# Patient Record
Sex: FEMALE | Race: WHITE | NOT HISPANIC OR LATINO
[De-identification: names, ages, dates, MRNs, and addresses within clinical notes are randomized per-mention and may not be internally consistent; named-entity substitution may affect disease eponyms.]

---

## 2018-04-27 ENCOUNTER — RESULT REVIEW (OUTPATIENT)
Age: 33
End: 2018-04-27

## 2018-07-13 VITALS
RESPIRATION RATE: 18 BRPM | TEMPERATURE: 97 F | SYSTOLIC BLOOD PRESSURE: 110 MMHG | WEIGHT: 119.93 LBS | HEIGHT: 63 IN | HEART RATE: 50 BPM | DIASTOLIC BLOOD PRESSURE: 63 MMHG | OXYGEN SATURATION: 98 %

## 2018-07-16 ENCOUNTER — RESULT REVIEW (OUTPATIENT)
Age: 33
End: 2018-07-16

## 2018-07-16 ENCOUNTER — OUTPATIENT (OUTPATIENT)
Dept: OUTPATIENT SERVICES | Facility: HOSPITAL | Age: 33
LOS: 1 days | Discharge: HOME | End: 2018-07-16
Payer: COMMERCIAL

## 2018-07-16 VITALS
RESPIRATION RATE: 15 BRPM | OXYGEN SATURATION: 94 % | DIASTOLIC BLOOD PRESSURE: 50 MMHG | SYSTOLIC BLOOD PRESSURE: 103 MMHG | HEART RATE: 64 BPM

## 2018-07-16 DIAGNOSIS — O34.40 MATERNAL CARE FOR OTHER ABNORMALITIES OF CERVIX, UNSPECIFIED TRIMESTER: Chronic | ICD-10-CM

## 2018-07-16 PROCEDURE — 58558 HYSTEROSCOPY BIOPSY: CPT

## 2018-07-16 PROCEDURE — C1889: CPT

## 2018-07-16 PROCEDURE — 86901 BLOOD TYPING SEROLOGIC RH(D): CPT

## 2018-07-16 PROCEDURE — 86850 RBC ANTIBODY SCREEN: CPT

## 2018-07-16 PROCEDURE — 58662 LAPAROSCOPY EXCISE LESIONS: CPT

## 2018-07-16 PROCEDURE — 86900 BLOOD TYPING SEROLOGIC ABO: CPT

## 2018-07-16 PROCEDURE — 88305 TISSUE EXAM BY PATHOLOGIST: CPT

## 2018-07-16 RX ORDER — KETOROLAC TROMETHAMINE 30 MG/ML
30 SYRINGE (ML) INJECTION ONCE
Qty: 0 | Refills: 0 | Status: DISCONTINUED | OUTPATIENT
Start: 2018-07-16 | End: 2018-07-16

## 2018-07-16 RX ORDER — SODIUM CHLORIDE 9 MG/ML
1000 INJECTION, SOLUTION INTRAVENOUS
Qty: 0 | Refills: 0 | Status: DISCONTINUED | OUTPATIENT
Start: 2018-07-16 | End: 2018-07-16

## 2018-07-16 RX ORDER — ONDANSETRON 8 MG/1
4 TABLET, FILM COATED ORAL ONCE
Qty: 0 | Refills: 0 | Status: DISCONTINUED | OUTPATIENT
Start: 2018-07-16 | End: 2018-07-16

## 2018-07-16 RX ORDER — DIPHENHYDRAMINE HCL 50 MG
25 CAPSULE ORAL ONCE
Qty: 0 | Refills: 0 | Status: COMPLETED | OUTPATIENT
Start: 2018-07-16 | End: 2018-07-16

## 2018-07-16 RX ORDER — IBUPROFEN 200 MG
600 TABLET ORAL ONCE
Qty: 0 | Refills: 0 | Status: COMPLETED | OUTPATIENT
Start: 2018-07-16 | End: 2018-07-16

## 2018-07-16 RX ORDER — MORPHINE SULFATE 50 MG/1
4 CAPSULE, EXTENDED RELEASE ORAL ONCE
Qty: 0 | Refills: 0 | Status: DISCONTINUED | OUTPATIENT
Start: 2018-07-16 | End: 2018-07-16

## 2018-07-16 RX ADMIN — Medication 600 MILLIGRAM(S): at 16:35

## 2018-07-16 RX ADMIN — MORPHINE SULFATE 4 MILLIGRAM(S): 50 CAPSULE, EXTENDED RELEASE ORAL at 13:15

## 2018-07-16 RX ADMIN — Medication 600 MILLIGRAM(S): at 17:15

## 2018-07-16 RX ADMIN — MORPHINE SULFATE 4 MILLIGRAM(S): 50 CAPSULE, EXTENDED RELEASE ORAL at 13:00

## 2018-07-16 RX ADMIN — Medication 25 MILLIGRAM(S): at 13:25

## 2018-07-16 NOTE — BRIEF OPERATIVE NOTE - POST-OP DX
Endometriosis  07/16/2018    Active  Vicky Grewal  Uterine polyp  07/16/2018    Active  Vicky Grewal

## 2018-07-16 NOTE — PACU DISCHARGE NOTE - COMMENTS
1715 Pt comfortable . VSS .Abdominal dressing clean and dry. Pt voided.  IV dc'd-dressing applied  Home care and medication reconciliation done with Pt/mother

## 2018-07-16 NOTE — BRIEF OPERATIVE NOTE - PROCEDURE
<<-----Click on this checkbox to enter Procedure Laparoscopy with biopsy  07/16/2018    Active  TAMIA  Hysteroscopic polypectomy of uterus with dilation and curettage  07/16/2018    Active  TAMIA

## 2018-07-23 LAB — SURGICAL PATHOLOGY STUDY: SIGNIFICANT CHANGE UP

## 2020-09-09 PROBLEM — G43.909 MIGRAINE, UNSPECIFIED, NOT INTRACTABLE, WITHOUT STATUS MIGRAINOSUS: Chronic | Status: ACTIVE | Noted: 2018-07-13

## 2020-09-09 PROBLEM — F32.9 MAJOR DEPRESSIVE DISORDER, SINGLE EPISODE, UNSPECIFIED: Chronic | Status: ACTIVE | Noted: 2018-07-13

## 2020-09-09 PROBLEM — F41.9 ANXIETY DISORDER, UNSPECIFIED: Chronic | Status: ACTIVE | Noted: 2018-07-13

## 2020-09-09 PROBLEM — Z00.00 ENCOUNTER FOR PREVENTIVE HEALTH EXAMINATION: Status: ACTIVE | Noted: 2020-09-09

## 2020-09-28 ENCOUNTER — APPOINTMENT (OUTPATIENT)
Dept: COLORECTAL SURGERY | Facility: CLINIC | Age: 35
End: 2020-09-28
Payer: COMMERCIAL

## 2020-09-28 VITALS
HEIGHT: 63 IN | HEART RATE: 54 BPM | BODY MASS INDEX: 21.09 KG/M2 | DIASTOLIC BLOOD PRESSURE: 71 MMHG | OXYGEN SATURATION: 99 % | SYSTOLIC BLOOD PRESSURE: 122 MMHG | WEIGHT: 119 LBS | TEMPERATURE: 98.1 F

## 2020-09-28 DIAGNOSIS — K64.4 RESIDUAL HEMORRHOIDAL SKIN TAGS: ICD-10-CM

## 2020-09-28 DIAGNOSIS — Z82.49 FAMILY HISTORY OF ISCHEMIC HEART DISEASE AND OTHER DISEASES OF THE CIRCULATORY SYSTEM: ICD-10-CM

## 2020-09-28 DIAGNOSIS — Z87.42 PERSONAL HISTORY OF OTHER DISEASES OF THE FEMALE GENITAL TRACT: ICD-10-CM

## 2020-09-28 DIAGNOSIS — Z80.8 FAMILY HISTORY OF MALIGNANT NEOPLASM OF OTHER ORGANS OR SYSTEMS: ICD-10-CM

## 2020-09-28 DIAGNOSIS — D3A.026 BENIGN CARCINOID TUMOR OF THE RECTUM: ICD-10-CM

## 2020-09-28 DIAGNOSIS — Z86.19 PERSONAL HISTORY OF OTHER INFECTIOUS AND PARASITIC DISEASES: ICD-10-CM

## 2020-09-28 DIAGNOSIS — Z72.89 OTHER PROBLEMS RELATED TO LIFESTYLE: ICD-10-CM

## 2020-09-28 DIAGNOSIS — Z86.010 PERSONAL HISTORY OF COLONIC POLYPS: ICD-10-CM

## 2020-09-28 DIAGNOSIS — Z80.6 FAMILY HISTORY OF LEUKEMIA: ICD-10-CM

## 2020-09-28 PROCEDURE — 99203 OFFICE O/P NEW LOW 30 MIN: CPT | Mod: 25

## 2020-09-28 PROCEDURE — 46600 DIAGNOSTIC ANOSCOPY SPX: CPT

## 2020-09-28 RX ORDER — HYDROCORTISONE 25 MG/G
2.5 CREAM TOPICAL 3 TIMES DAILY
Qty: 1 | Refills: 2 | Status: ACTIVE | COMMUNITY
Start: 2020-09-28 | End: 1900-01-01

## 2020-09-28 NOTE — PHYSICAL EXAM
[FreeTextEntry1] : Medical assistant present for duration of physical examination\par \par Gen no acute distress, alert and oriented\par Psych calm, pleasant demeanor, responding appropriately to question\par Nonlabored breathing\par Ambulating without assistance\par Skin normal color and pigment, no visible lesions or rashes\par \par \par Anorectal Exam:\par Inspection no erythema, induration or fluctuance, no skin excoriation, no fissure, posterior midline/right posterior external hemorrhoid nonthrombosed - patient confirms this is the tissue of concern regarding her hemorrhoidal symptoms\par LUANN nontender, no masses palpated, no blood on gloved finger\par \par Procedure: Anoscopy\par \par Pre procedure Diagnosis:  hemorrhoids\par Post procedure Diagnosis: hemorrhoids\par Anesthesia: none\par Estimated blood loss: none\par Specimen: none\par Complications: none\par \par Consent obtained. Anoscopy was performed by passing a lighted anoscope with lubricant jelly into the anal canal and the entire anal mucosal surface was inspected. Findings included no fissure, mild internal hemorrhoidal cushion right posterior, no visible masses or lesions\par \par Patient tolerated examination and procedure well.\par \par \par

## 2020-09-28 NOTE — HISTORY OF PRESENT ILLNESS
[FreeTextEntry1] : 36 yo F presents for evaluation of internal hemorrhoids referred by GI Dr. Barrett\par \par PSH laparoscopy for endometriosis in 2018\par LEEP in 2010\par hx of likely IBS w/ varying constipation or diarrhea and "burning" sensation in abdomen\par Hx of colonoscopy in 2015 with polyp\par \par Over the past years, has traveled and worked on farms, passed a worm with BM 11/2019 while living in [place]. [place] to [place] due to COVID pandemic and underwent screening colonoscopy\par Underwent colonoscopy 8/2020, diagnosed with rectal carcinoid tumor and also noted internal hemorrhoids\par Followed by [facility], pending [employer] insurance approval\par Patient reports she is not here for carcinoid tumor evaluation\par c/o of years of swollen tissue that's always out and worsened w/ increased BMs frequency\par +burning and itching\par + prolapsing tissue w/ every BM that requires manual reduction\par + difficulty cleaning due to inflamed tissue\par + occasional BRBPR noted on TP after bouts of diarrhea\par Has tried OTC creams w/ temporary improvement\par Has never been prescribed hemorrhoidal cream\par c/o recurrent BV that she feels may be related to hemorrhoids\par Not performing sitz baths\par BH: varies from 4 times daily to every other day\par Reports adequate intake of dietary fiber and water\par Denies use of stool softeners or fiber supplements or antidiarrheal medication\par \par Denies [facility] colorectal CA. Mother and grandparents w/ skin CA, maternal uncle w/ Leukemia\par No  ASA. Took Aleve in the last week for back pain\par

## 2020-09-28 NOTE — REVIEW OF SYSTEMS
[As Noted in HPI] : as noted in HPI [Easy Bruising] : a tendency for easy bruising [Negative] : Endocrine [FreeTextEntry8] : recurrent BV [de-identified] : Stress

## 2020-09-28 NOTE — ASSESSMENT
[FreeTextEntry1] : Exam findings and diagnosis were discussed at length with patient. \par Recommendations including increased fiber intake, adequate daily hydration, stool softeners as needed, and sitz baths as needed and after bowel movements were discussed.\par Medical management, such as hydrocortisone cream, was discussed.\par Office based treatment, such as rubber band ligation, was discussed as an alternative if symptoms persist despite conservative management for internal hemorrhoids.\par However, patient's hemorrhoid is predominantly external without an internal component amenable to rubber banding at this time. Role of hemorrhoidectomy also discussed.\par \par Of note, patient recently diagnosed with rectal carcinoid tumor and is in the midst of workup for this condition.\par Will obtain collateral information from referring GI.\par \par Patient understands surgical management may be indicated for rectal carcinoid.\par Recommend conservative management/hydrocortisone cream for hemorrhoid until management plan is completed for rectal carcinoid. Patient can consider elective hemorrhoidectomy at a future date pending workup and management of carcinoid tumor.\par \par All questions answered, patient expressed understanding and is agreeable to this plan.\par

## 2020-09-30 RX ORDER — HYDROCORTISONE 25 MG/G
2.5 CREAM TOPICAL
Qty: 30 | Refills: 2 | Status: ACTIVE | COMMUNITY
Start: 2020-09-30 | End: 1900-01-01